# Patient Record
(demographics unavailable — no encounter records)

---

## 2024-11-11 NOTE — DISCUSSION/SUMMARY
[FreeTextEntry3] : HPI   64 y F presents to [facility] for her 3rd annual health exam.  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   PCP: Dr. 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 Pt was in an area contaminated with high levels of dust (Tier 1)   PMH/PSH: asthma, Malignant neoplasm of thyroid gland,   hypothyroidism- thyroidectomy- fractured left radial head s/p fall menopausal since 2015, REJI, GERD, chronic rhinosinusitis    Fam Hx: father CAD mother diabetes htn Allergies: iodine and flonase  Meds: see above  Soc Hx:  Smoking Status:  former quit 1983 smoked for 4 years less than a pack a day    Review of Systems-IAMQ reviewed with patient  PE: in trial DB     Plan: -CBC, CMP, lipids, UA ordered -Imaging: chest imaging ordered today -Spirometry:  will do at pul appt -Influenza Vaccine today - indications for Colonoscopy discussed last done - 07/2021 -  indications for Mammogram discussed last done 11/ 2021 ordered today - indications for Pap smear discussed  last done 11/2021 ordered gyn referral today -sleep study reviewed  RTC 6 months, follow up sooner by phone if symptoms change

## 2024-11-11 NOTE — PAST MEDICAL HISTORY
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

## 2024-11-11 NOTE — ASSESSMENT
[FreeTextEntry1] : A/P:   -chronic resp, chronic rhinosinusitis-  pulm referral with full pft  Chronic Rhinitis- cont allegra, r ENT eval and manage symptoms of wtc condition c/o frequent headaches, runny, stuffy nose, sinus pain and congestion   GERD-- avoid eating 3 hours prior to sleep,  avoid trigger foods ;    RTC 6 months, follow up sooner by phone if symptoms change

## 2024-11-11 NOTE — HEALTH RISK ASSESSMENT
[Patient reported mammogram was normal] : Patient reported mammogram was normal [Patient reported PAP Smear was normal] : Patient reported PAP Smear was normal [Patient reported colonoscopy was normal] : Patient reported colonoscopy was normal [MammogramDate] : 2021 [MammogramComments] : will do on her own  [PapSmearDate] : 2021 [PapSmearComments] : not due  [ColonoscopyDate] : 7/2021 [ColonoscopyComments] : 2 benign polyps removed

## 2024-11-11 NOTE — HISTORY OF PRESENT ILLNESS
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   1. 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 She is needing rescue 6-7 times over the past 2 weeks (down from 5x a week)  2. Chronic Rhinosinusitis-  -well controlled with Allegra  3. GERD-  - symptoms significantly improved/ resolved since starting pantoprazole.   non wtc related:   4. sleep study reviewed-   5. Pt with history of thyroid cancer 04/2001 06/2001 s/p thyroidectomy   Pt with left cervical level 6 lymph node since 8/2021- had it biopsied that was negative but they still concerned and want to watch it has a follow up at [facility] in a few months

## 2024-11-11 NOTE — DISCUSSION/SUMMARY
[FreeTextEntry3] : HPI   64 y F presents to [facility] for her 3rd annual health exam.  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   PCP: Dr. 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 Pt was in an area contaminated with high levels of dust (Tier 1)   PMH/PSH: asthma, Malignant neoplasm of thyroid gland,   hypothyroidism- thyroidectomy- fractured left radial head s/p fall menopausal since 2015, REJI, GERD, chronic rhinosinusitis    Fam Hx: father CAD mother diabetes htn Allergies: iodine and flonase  Meds: see above  Soc Hx:  Smoking Status:  former quit 1983 smoked for 4 years less than a pack a day    Review of Systems-IAMQ reviewed with patient  PE: in trial DB     Plan: -CBC, CMP, lipids, UA ordered -Imaging: chest imaging ordered today -Spirometry:  will do at pul appt -Influenza Vaccine today - indications for Colonoscopy discussed last done - 07/2021 -  indications for Mammogram discussed last done 11/ 2021 ordered today - indications for Pap smear discussed  last done 11/2021 ordered gyn referral today -sleep study reviewed  RTC 6 months, follow up sooner by phone if symptoms change

## 2024-11-11 NOTE — HISTORY OF PRESENT ILLNESS
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   1. 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 She is needing rescue 6-7 times over the past 2 weeks (down from 5x a week)  2. Chronic Rhinosinusitis-  -well controlled with Allegra  3. GERD-  - symptoms significantly improved/ resolved since starting pantoprazole.   non wtc related:   4. sleep study reviewed-   5. Pt with history of thyroid cancer 04/2001 06/2001 s/p thyroidectomy   Pt with left cervical level 6 lymph node since 8/2021- had it biopsied that was negative but they still concerned and want to watch it has a follow up at [facility] in a few months     Dupixent Amount: 200 mg

## 2024-11-11 NOTE — PHYSICAL EXAM
[Neck Appearance] : the appearance of the neck was normal [] : the neck was supple [Auscultation Breath Sounds / Voice Sounds] : lungs were clear to auscultation bilaterally [Heart Sounds] : normal S1 and S2 [Edema] : there was no peripheral edema [Bowel Sounds] : normal bowel sounds [Abdomen Soft] : soft [Abdomen Tenderness] : non-tender [Abnormal Walk] : normal gait [Skin Color & Pigmentation] : normal skin color and pigmentation [Oriented To Time, Place, And Person] : oriented to person, place, and time

## 2024-11-11 NOTE — PAST MEDICAL HISTORY
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

## 2024-11-11 NOTE — REVIEW OF SYSTEMS
[Shortness Of Breath] : shortness of breath [Wheezing] : wheezing [Cough] : cough [SOB on Exertion] : shortness of breath during exertion [Heartburn] : heartburn [Joint Pain] : joint pain [Anxiety] : anxiety [Chest Pain] : no chest pain [Palpitations] : no palpitations [Constipation] : no constipation [Dysuria] : no dysuria [Incontinence] : no incontinence [Skin Lesions] : no skin lesions

## 2025-02-25 NOTE — ASSESSMENT
[FreeTextEntry1] : Patient presents to the office today for a follow-up visit for her asthma, patient last seen in 2023. Patient with complaints of nighttime coughing fits, endorses GERD symptoms which she takes OTC medications for as needed.  Asthma:  -Patient states she ran out of symbicort and has not used in several months -Will renew Symbicort mart therapy.  GERD/ cough- -Will restart Pantoprazole daily as patient has complaints of nighttime coughing fits.  -complaints of GERD  rtc in 3 months  IWalker NP, am scribing for and in the presence of Dr. Madi Wallace, the following sections HISTORY OF PRESENT ILLNESS, PAST MEDICAL/FAMILY/SOCIAL HISTORY; REVIEW OF SYSTEMS; VITAL SIGNS; PHYSICAL EXAM; DISPOSITION.

## 2025-02-25 NOTE — HISTORY OF PRESENT ILLNESS
[Former] : former [Never] : never [TextBox_4] :  64-year-old female PMH Asthma, WTC  here for f/u asthma/ chronic cough. Patient last seen in the office in 2023. Patient states since her last visit she has suffered from worsening brain fog. Patient endorses nighttime coughing fits which occur at random. Patient states she suffers from GERD and has been taking OTC therapies and tried to increase her water intake. States she eats dinner around 430-5 pm.   Patient states she is scheduled to have a procedure at [facility] next week for her parathyroid. Patient denies any hospitalizations since last visit. Patient was previously prescribed symbicort which she has run out of so she no longer takes. Patient states she has not used her symbicort or albuterol in the past several months.    PFT 2021: FEV1/FVC 80 [id number] FVC 87 TLC 90 DLCO 82  Social: Former smoker Quit 1983